# Patient Record
Sex: MALE | Race: WHITE | NOT HISPANIC OR LATINO | Employment: UNEMPLOYED | URBAN - METROPOLITAN AREA
[De-identification: names, ages, dates, MRNs, and addresses within clinical notes are randomized per-mention and may not be internally consistent; named-entity substitution may affect disease eponyms.]

---

## 2017-11-29 ENCOUNTER — HOSPITAL ENCOUNTER (EMERGENCY)
Facility: HOSPITAL | Age: 42
Discharge: PSYCHIATRIC OR SUBSTANCE USE FACILITY | End: 2017-11-30
Attending: EMERGENCY MEDICINE
Payer: OTHER GOVERNMENT

## 2017-11-29 DIAGNOSIS — R45.851 DEPRESSION WITH SUICIDAL IDEATION: Primary | ICD-10-CM

## 2017-11-29 DIAGNOSIS — F32.A DEPRESSION WITH SUICIDAL IDEATION: Primary | ICD-10-CM

## 2017-11-29 LAB
ALBUMIN SERPL BCP-MCNC: 4.1 G/DL
ALP SERPL-CCNC: 62 U/L
ALT SERPL W/O P-5'-P-CCNC: 22 U/L
AMPHET+METHAMPHET UR QL: NEGATIVE
ANION GAP SERPL CALC-SCNC: 9 MMOL/L
APAP SERPL-MCNC: <3 UG/ML
AST SERPL-CCNC: 18 U/L
BARBITURATES UR QL SCN>200 NG/ML: NEGATIVE
BASOPHILS # BLD AUTO: 0.02 K/UL
BASOPHILS NFR BLD: 0.2 %
BENZODIAZ UR QL SCN>200 NG/ML: NEGATIVE
BILIRUB SERPL-MCNC: 0.6 MG/DL
BILIRUB UR QL STRIP: NEGATIVE
BUN SERPL-MCNC: 14 MG/DL
BZE UR QL SCN: NEGATIVE
CALCIUM SERPL-MCNC: 9.5 MG/DL
CANNABINOIDS UR QL SCN: NEGATIVE
CHLORIDE SERPL-SCNC: 107 MMOL/L
CLARITY UR: CLEAR
CO2 SERPL-SCNC: 26 MMOL/L
COLOR UR: YELLOW
CREAT SERPL-MCNC: 1.1 MG/DL
CREAT UR-MCNC: 147.1 MG/DL
DIFFERENTIAL METHOD: NORMAL
EOSINOPHIL # BLD AUTO: 0.4 K/UL
EOSINOPHIL NFR BLD: 4.6 %
ERYTHROCYTE [DISTWIDTH] IN BLOOD BY AUTOMATED COUNT: 12.6 %
EST. GFR  (AFRICAN AMERICAN): >60 ML/MIN/1.73 M^2
EST. GFR  (NON AFRICAN AMERICAN): >60 ML/MIN/1.73 M^2
ETHANOL SERPL-MCNC: <10 MG/DL
GLUCOSE SERPL-MCNC: 109 MG/DL
GLUCOSE UR QL STRIP: NEGATIVE
HCT VFR BLD AUTO: 44.3 %
HGB BLD-MCNC: 15.3 G/DL
HGB UR QL STRIP: NEGATIVE
KETONES UR QL STRIP: NEGATIVE
LEUKOCYTE ESTERASE UR QL STRIP: NEGATIVE
LYMPHOCYTES # BLD AUTO: 1.9 K/UL
LYMPHOCYTES NFR BLD: 21.7 %
MCH RBC QN AUTO: 29.9 PG
MCHC RBC AUTO-ENTMCNC: 34.5 G/DL
MCV RBC AUTO: 87 FL
METHADONE UR QL SCN>300 NG/ML: NEGATIVE
MONOCYTES # BLD AUTO: 0.6 K/UL
MONOCYTES NFR BLD: 7.2 %
NEUTROPHILS # BLD AUTO: 5.8 K/UL
NEUTROPHILS NFR BLD: 66.3 %
NITRITE UR QL STRIP: NEGATIVE
OPIATES UR QL SCN: NEGATIVE
PCP UR QL SCN>25 NG/ML: NEGATIVE
PH UR STRIP: 7 [PH] (ref 5–8)
PLATELET # BLD AUTO: 201 K/UL
PMV BLD AUTO: 9.8 FL
POTASSIUM SERPL-SCNC: 3.8 MMOL/L
PROT SERPL-MCNC: 7.8 G/DL
PROT UR QL STRIP: NEGATIVE
RBC # BLD AUTO: 5.12 M/UL
SALICYLATES SERPL-MCNC: <5 MG/DL
SODIUM SERPL-SCNC: 142 MMOL/L
SP GR UR STRIP: 1.02 (ref 1–1.03)
T4 FREE SERPL-MCNC: 1.02 NG/DL
TOXICOLOGY INFORMATION: NORMAL
TSH SERPL DL<=0.005 MIU/L-ACNC: 0.31 UIU/ML
URN SPEC COLLECT METH UR: NORMAL
UROBILINOGEN UR STRIP-ACNC: 1 EU/DL
WBC # BLD AUTO: 8.72 K/UL

## 2017-11-29 PROCEDURE — 84443 ASSAY THYROID STIM HORMONE: CPT

## 2017-11-29 PROCEDURE — 99285 EMERGENCY DEPT VISIT HI MDM: CPT

## 2017-11-29 PROCEDURE — 80053 COMPREHEN METABOLIC PANEL: CPT

## 2017-11-29 PROCEDURE — 84439 ASSAY OF FREE THYROXINE: CPT

## 2017-11-29 PROCEDURE — 81003 URINALYSIS AUTO W/O SCOPE: CPT

## 2017-11-29 PROCEDURE — 80307 DRUG TEST PRSMV CHEM ANLYZR: CPT

## 2017-11-29 PROCEDURE — 85025 COMPLETE CBC W/AUTO DIFF WBC: CPT

## 2017-11-29 PROCEDURE — 80320 DRUG SCREEN QUANTALCOHOLS: CPT

## 2017-11-29 PROCEDURE — 80329 ANALGESICS NON-OPIOID 1 OR 2: CPT

## 2017-11-30 VITALS
HEART RATE: 71 BPM | DIASTOLIC BLOOD PRESSURE: 85 MMHG | SYSTOLIC BLOOD PRESSURE: 117 MMHG | WEIGHT: 185.31 LBS | HEIGHT: 69 IN | TEMPERATURE: 98 F | OXYGEN SATURATION: 100 % | RESPIRATION RATE: 18 BRPM | BODY MASS INDEX: 27.45 KG/M2

## 2017-11-30 NOTE — ED PROVIDER NOTES
"SCRIBE #1 NOTE: I, Preeti Isreal, am scribing for, and in the presence of, Izabella Soriano MD. I have scribed the entire note.      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 male patient who presents to the Emergency Department for SI which onset gradually months PTA. Pt states he has been having a "hard time" since getting out of active duty. Pt reports he has a plan but will not elaborate further. Pt lives at home with his wife and 3 children. Pt has access to firearms. Symptoms are constant and moderate in severity. No mitigating or exacerbating factors reported. No other associated sxs reported at this time. Patient denies any pain, HI, auditory/visual hallucinations, sleep disturbances, EtOH use, IV drug use, and all other sxs at this time. Pt denies prior Tx. No further complaints or concerns at this time.       Arrival mode: Personal vehicle     PCP: Not given      Past Medical History:  Past medical history reviewed not relevant      Past Surgical History:  Past surgical history reviewed not relevant      Family History:  Family history reviewed not relevant      Social History:  Social History    Social History Main Topics    Social History Main Topics    Smoking status: Unknown if ever smoked    Smokeless tobacco: Unknown if ever used    Alcohol Use: Unknown drinking history    Drug Use: Unknown if ever used    Sexual Activity: Unknown         ROS   Review of Systems   Constitutional: Negative for fever.        (-) EtOH  (-) IV Drugs   HENT: Negative for sore throat.    Respiratory: Negative for shortness of breath.    Cardiovascular: Negative for chest pain.   Gastrointestinal: " Negative for nausea.   Genitourinary: Negative for dysuria.   Musculoskeletal: Negative for arthralgias and myalgias.   Skin: Negative for rash.   Neurological: Negative for weakness.   Hematological: Does not bruise/bleed easily.   Psychiatric/Behavioral: Positive for suicidal ideas. Negative for hallucinations and sleep disturbance.        (-) HI   All other systems reviewed and are negative.      Physical Exam      Initial Vitals [11/29/17 1950]   BP Pulse Resp Temp SpO2   (!) 149/94 89 18 98.5 °F (36.9 °C) 97 %      MAP       112.33          Physical Exam  Nursing Notes and Vital Signs Reviewed.  Constitutional: Patient is in no acute distress. Well-developed and well-nourished.  Head: Atraumatic. Normocephalic.  Eyes: PERRL. EOM intact. Conjunctivae are not pale. No scleral icterus.  ENT: Mucous membranes are moist. Oropharynx is clear and symmetric.    Neck: Supple. Full ROM. No lymphadenopathy.  Cardiovascular: Regular rate. Regular rhythm. No murmurs, rubs, or gallops. Distal pulses are 2+ and symmetric.  Pulmonary/Chest: No respiratory distress. Clear to auscultation bilaterally. No wheezing or rales.  Abdominal: Soft and non-distended.  There is no tenderness.   Musculoskeletal: Moves all extremities. No obvious deformities. No edema.   Skin: Warm and dry.  Neurological:  Alert, awake, and appropriate.  Normal speech.  No acute focal neurological deficits are appreciated.  Psychiatric:               Behavior: normal, cooperative              Mood and Affect: flat affect              Thought Process: within normal limits              Suicidal Ideations: Yes              Suicidal Plan: Specific plan to harm self. Would not elaborate. Pt has access to firearms.              Homicidal Ideations: No              Hallucinations: none      ED Course    Procedures  ED Vital Signs:  Vitals:    11/29/17 1950 11/30/17 0010 11/30/17 0014   BP: (!) 149/94 117/85 117/85   Pulse: 89 71 71   Resp: 18 18 18   Temp: 98.5 °F  "(36.9 °C) 98.4 °F (36.9 °C) 98.4 °F (36.9 °C)   TempSrc: Oral     SpO2: 97% 100%    Weight: 84.1 kg (185 lb 4.8 oz)     Height: 5' 9" (1.753 m)         Abnormal Lab Results:  Labs Reviewed   TSH - Abnormal; Notable for the following:        Result Value    TSH 0.308 (*)     All other components within normal limits   ACETAMINOPHEN LEVEL - Abnormal; Notable for the following:     Acetaminophen (Tylenol), Serum <3.0 (*)     All other components within normal limits   SALICYLATE LEVEL - Abnormal; Notable for the following:     Salicylate Lvl <5.0 (*)     All other components within normal limits   CBC W/ AUTO DIFFERENTIAL   COMPREHENSIVE METABOLIC PANEL   URINALYSIS   DRUG SCREEN PANEL, URINE EMERGENCY   ALCOHOL,MEDICAL (ETHANOL)   T4, FREE        All Lab Results:  Results for orders placed or performed during the hospital encounter of 11/29/17   CBC auto differential   Result Value Ref Range    WBC 8.72 3.90 - 12.70 K/uL    RBC 5.12 4.60 - 6.20 M/uL    Hemoglobin 15.3 14.0 - 18.0 g/dL    Hematocrit 44.3 40.0 - 54.0 %    MCV 87 82 - 98 fL    MCH 29.9 27.0 - 31.0 pg    MCHC 34.5 32.0 - 36.0 g/dL    RDW 12.6 11.5 - 14.5 %    Platelets 201 150 - 350 K/uL    MPV 9.8 9.2 - 12.9 fL    Gran # 5.8 1.8 - 7.7 K/uL    Lymph # 1.9 1.0 - 4.8 K/uL    Mono # 0.6 0.3 - 1.0 K/uL    Eos # 0.4 0.0 - 0.5 K/uL    Baso # 0.02 0.00 - 0.20 K/uL    Gran% 66.3 38.0 - 73.0 %    Lymph% 21.7 18.0 - 48.0 %    Mono% 7.2 4.0 - 15.0 %    Eosinophil% 4.6 0.0 - 8.0 %    Basophil% 0.2 0.0 - 1.9 %    Differential Method Automated    Comprehensive metabolic panel   Result Value Ref Range    Sodium 142 136 - 145 mmol/L    Potassium 3.8 3.5 - 5.1 mmol/L    Chloride 107 95 - 110 mmol/L    CO2 26 23 - 29 mmol/L    Glucose 109 70 - 110 mg/dL    BUN, Bld 14 6 - 20 mg/dL    Creatinine 1.1 0.5 - 1.4 mg/dL    Calcium 9.5 8.7 - 10.5 mg/dL    Total Protein 7.8 6.0 - 8.4 g/dL    Albumin 4.1 3.5 - 5.2 g/dL    Total Bilirubin 0.6 0.1 - 1.0 mg/dL    Alkaline Phosphatase " 62 55 - 135 U/L    AST 18 10 - 40 U/L    ALT 22 10 - 44 U/L    Anion Gap 9 8 - 16 mmol/L    eGFR if African American >60 >60 mL/min/1.73 m^2    eGFR if non African American >60 >60 mL/min/1.73 m^2   TSH   Result Value Ref Range    TSH 0.308 (L) 0.400 - 4.000 uIU/mL   Urinalysis - clean catch   Result Value Ref Range    Specimen UA Urine, Clean Catch     Color, UA Yellow Yellow, Straw, Alison    Appearance, UA Clear Clear    pH, UA 7.0 5.0 - 8.0    Specific Gravity, UA 1.020 1.005 - 1.030    Protein, UA Negative Negative    Glucose, UA Negative Negative    Ketones, UA Negative Negative    Bilirubin (UA) Negative Negative    Occult Blood UA Negative Negative    Nitrite, UA Negative Negative    Urobilinogen, UA 1.0 <2.0 EU/dL    Leukocytes, UA Negative Negative   Drug screen panel, emergency   Result Value Ref Range    Benzodiazepines Negative     Methadone metabolites Negative     Cocaine (Metab.) Negative     Opiate Scrn, Ur Negative     Barbiturate Screen, Ur Negative     Amphetamine Screen, Ur Negative     THC Negative     Phencyclidine Negative     Creatinine, Random Ur 147.1 23.0 - 375.0 mg/dL    Toxicology Information SEE COMMENT    Ethanol   Result Value Ref Range    Alcohol, Medical, Serum <10 <10 mg/dL   Acetaminophen level   Result Value Ref Range    Acetaminophen (Tylenol), Serum <3.0 (L) 10.0 - 20.0 ug/mL   Salicylate level   Result Value Ref Range    Salicylate Lvl <5.0 (L) 15.0 - 30.0 mg/dL   T4, free   Result Value Ref Range    Free T4 1.02 0.71 - 1.51 ng/dL              The Emergency Provider reviewed the vital signs and test results, which are outlined above.    ED Discussion     8:35 PM: The PEC hold has been issued by Dr. Soriano at this time for SI.    10:16 PM: Pt has been medically cleared by Dr. Soriano at this time. Reassessed pt at this time. Pt is resting comfortably and appears in no acute distress. There are no psychiatric services offered at this facility. D/w pt all pertinent ED information  and plan to transfer to psychiatric facility for psychiatric treatment. Pt verbalizes understanding. Patient being transferred by [facility] for ongoing personal protection en route. Pt will be transported by personnel trained in CPR and CPI. Patient understands that there could be unforeseen motor vehicle accidents, inclement weather, or loss of vital signs that could result in potential death or permanent disability. All questions and complaints have been addressed at this time. Pt condition is stable at this time and is clear to transfer to psychiatric facility at this time.     12:08 AM: Pt has been accepted at [facility]. Accepting physician is Dr. Hameed. Pt will be transported by [facility] for ongoing personal protection en route. Pt will be transported by personnel trained in CPR and CPI. Risks and benefits of transfer/travel were discussed with pt priorly. Pt expresses understand.        ED Medication(s):  Medications - No data to display    There are no discharge medications for this patient.            Medical Decision Making    Medical Decision Making:   Clinical Tests:   Lab Tests: Ordered and Reviewed           Scribe Attestation:   Scribe #1: I performed the above scribed service and the documentation accurately describes the services I performed. I attest to the accuracy of the note.    Attending:   Physician Attestation Statement for Scribe #1: I, Izabella Soriano MD, personally performed the services described in this documentation, as scribed by Preeti Bach, in my presence, and it is both accurate and complete.          Clinical Impression       ICD-10-CM ICD-9-CM   1. Depression with suicidal ideation F32.9 311    R45.851 V62.84       Disposition:   Disposition: Transferred  Condition: Fair         Izabella Soriano MD  11/30/17 0550

## 2017-11-30 NOTE — ED NOTES
Pt contact - Lieutenant Myers [phone].  Contact him when patient transferred or for any problems.

## 2017-11-30 NOTE — ED NOTES
Spoke with Maj. Nichelle PORTER regarding placement options.  States ok to admit patient to any mental health facility.